# Patient Record
Sex: MALE | URBAN - METROPOLITAN AREA
[De-identification: names, ages, dates, MRNs, and addresses within clinical notes are randomized per-mention and may not be internally consistent; named-entity substitution may affect disease eponyms.]

---

## 2021-08-13 ENCOUNTER — APPOINTMENT (RX ONLY)
Dept: URBAN - METROPOLITAN AREA CLINIC 33 | Facility: CLINIC | Age: 56
Setting detail: DERMATOLOGY
End: 2021-08-13

## 2021-08-13 DIAGNOSIS — L30.1 DYSHIDROSIS [POMPHOLYX]: ICD-10-CM

## 2021-08-13 DIAGNOSIS — L73.1 PSEUDOFOLLICULITIS BARBAE: ICD-10-CM

## 2021-08-13 PROBLEM — L30.9 DERMATITIS, UNSPECIFIED: Status: ACTIVE | Noted: 2021-08-13

## 2021-08-13 PROCEDURE — ? COUNSELING

## 2021-08-13 PROCEDURE — ? PRESCRIPTION

## 2021-08-13 PROCEDURE — ? PRESCRIPTION MEDICATION MANAGEMENT

## 2021-08-13 PROCEDURE — 99203 OFFICE O/P NEW LOW 30 MIN: CPT

## 2021-08-13 RX ORDER — CLOBETASOL PROPIONATE 0.5 MG/G
OINTMENT TOPICAL PRN
Qty: 1 | Refills: 1 | Status: ERX | COMMUNITY
Start: 2021-08-13

## 2021-08-13 RX ADMIN — CLOBETASOL PROPIONATE: 0.5 OINTMENT TOPICAL at 00:00

## 2021-08-13 ASSESSMENT — LOCATION DETAILED DESCRIPTION DERM
LOCATION DETAILED: RIGHT ULNAR DORSAL HAND
LOCATION DETAILED: MID-OCCIPITAL SCALP
LOCATION DETAILED: RIGHT ELBOW
LOCATION DETAILED: RIGHT DORSAL FOOT
LOCATION DETAILED: LEFT ULNAR DORSAL HAND
LOCATION DETAILED: LEFT DORSAL FOOT
LOCATION DETAILED: LEFT ELBOW
LOCATION DETAILED: SUBMENTAL CHIN

## 2021-08-13 ASSESSMENT — LOCATION SIMPLE DESCRIPTION DERM
LOCATION SIMPLE: SUBMENTAL CHIN
LOCATION SIMPLE: RIGHT FOOT
LOCATION SIMPLE: POSTERIOR SCALP
LOCATION SIMPLE: LEFT FOOT
LOCATION SIMPLE: LEFT ELBOW
LOCATION SIMPLE: RIGHT ELBOW
LOCATION SIMPLE: RIGHT HAND
LOCATION SIMPLE: LEFT HAND

## 2021-08-13 ASSESSMENT — LOCATION ZONE DERM
LOCATION ZONE: SCALP
LOCATION ZONE: FACE
LOCATION ZONE: FEET
LOCATION ZONE: ARM
LOCATION ZONE: HAND

## 2021-08-13 NOTE — HPI: RASH
What Type Of Note Output Would You Prefer (Optional)?: Bullet Format
How Severe Is Your Rash?: severe
Is This A New Presentation, Or A Follow-Up?: Rash
Additional History: [facility] went to [facility] Monday and was given a TAC cream but it has not as helpful.

## 2021-08-13 NOTE — HPI: DRY SKIN
How Severe Is Your Dry Skin?: moderate
Additional History: He moisturizes daily but has very dry elbows.

## 2021-08-13 NOTE — PROCEDURE: PRESCRIPTION MEDICATION MANAGEMENT
Plan: No shaving. Keep closely clipped
Detail Level: Zone
Render In Strict Bullet Format?: No
Plan: Apply clobetasol BID for 2 weeks then as needed for flares

## 2021-08-13 NOTE — HPI: BUMPS
How Severe Are Your Bumps?: moderate
Is This A New Presentation, Or A Follow-Up?: Bumps
Additional History: After he cuts his hair he notices bumps popping up on the back of his scalp